# Patient Record
Sex: FEMALE | Race: WHITE | NOT HISPANIC OR LATINO | Employment: FULL TIME | URBAN - NONMETROPOLITAN AREA
[De-identification: names, ages, dates, MRNs, and addresses within clinical notes are randomized per-mention and may not be internally consistent; named-entity substitution may affect disease eponyms.]

---

## 2022-07-08 ENCOUNTER — LAB (OUTPATIENT)
Dept: LAB | Facility: HOSPITAL | Age: 57
End: 2022-07-08

## 2022-07-08 ENCOUNTER — OFFICE VISIT (OUTPATIENT)
Dept: FAMILY MEDICINE CLINIC | Facility: CLINIC | Age: 57
End: 2022-07-08

## 2022-07-08 VITALS
SYSTOLIC BLOOD PRESSURE: 122 MMHG | DIASTOLIC BLOOD PRESSURE: 78 MMHG | WEIGHT: 169.9 LBS | HEIGHT: 71 IN | HEART RATE: 68 BPM | OXYGEN SATURATION: 98 % | BODY MASS INDEX: 23.79 KG/M2 | RESPIRATION RATE: 18 BRPM

## 2022-07-08 DIAGNOSIS — Z12.4 ROUTINE PAPANICOLAOU SMEAR: ICD-10-CM

## 2022-07-08 DIAGNOSIS — R41.840 ATTENTION AND CONCENTRATION DEFICIT: ICD-10-CM

## 2022-07-08 DIAGNOSIS — Z76.89 ENCOUNTER TO ESTABLISH CARE: ICD-10-CM

## 2022-07-08 DIAGNOSIS — Z12.31 BREAST CANCER SCREENING BY MAMMOGRAM: ICD-10-CM

## 2022-07-08 DIAGNOSIS — R68.89 FORGETFULNESS: ICD-10-CM

## 2022-07-08 DIAGNOSIS — Z76.89 ENCOUNTER TO ESTABLISH CARE: Primary | ICD-10-CM

## 2022-07-08 DIAGNOSIS — L98.9 SKIN LESION OF FACE: ICD-10-CM

## 2022-07-08 LAB
ALBUMIN SERPL-MCNC: 4.8 G/DL (ref 3.5–5.2)
ALBUMIN/GLOB SERPL: 1.8 G/DL
ALP SERPL-CCNC: 102 U/L (ref 39–117)
ALT SERPL W P-5'-P-CCNC: 9 U/L (ref 1–33)
ANION GAP SERPL CALCULATED.3IONS-SCNC: 8.6 MMOL/L (ref 5–15)
AST SERPL-CCNC: 16 U/L (ref 1–32)
BILIRUB SERPL-MCNC: 0.7 MG/DL (ref 0–1.2)
BUN SERPL-MCNC: 13 MG/DL (ref 6–20)
BUN/CREAT SERPL: 16.9 (ref 7–25)
CALCIUM SPEC-SCNC: 9.1 MG/DL (ref 8.6–10.5)
CHLORIDE SERPL-SCNC: 101 MMOL/L (ref 98–107)
CHOLEST SERPL-MCNC: 204 MG/DL (ref 0–200)
CO2 SERPL-SCNC: 27.4 MMOL/L (ref 22–29)
CREAT SERPL-MCNC: 0.77 MG/DL (ref 0.57–1)
EGFRCR SERPLBLD CKD-EPI 2021: 91.2 ML/MIN/1.73
GLOBULIN UR ELPH-MCNC: 2.6 GM/DL
GLUCOSE SERPL-MCNC: 82 MG/DL (ref 65–99)
HBA1C MFR BLD: 4.9 % (ref 4.8–5.6)
HCV AB SER DONR QL: NORMAL
HDLC SERPL-MCNC: 54 MG/DL (ref 40–60)
LDLC SERPL CALC-MCNC: 138 MG/DL (ref 0–100)
LDLC/HDLC SERPL: 2.54 {RATIO}
POTASSIUM SERPL-SCNC: 4 MMOL/L (ref 3.5–5.2)
PROT SERPL-MCNC: 7.4 G/DL (ref 6–8.5)
SODIUM SERPL-SCNC: 137 MMOL/L (ref 136–145)
TRIGL SERPL-MCNC: 65 MG/DL (ref 0–150)
TSH SERPL DL<=0.05 MIU/L-ACNC: 1.54 UIU/ML (ref 0.27–4.2)
VLDLC SERPL-MCNC: 12 MG/DL (ref 5–40)

## 2022-07-08 PROCEDURE — 80061 LIPID PANEL: CPT

## 2022-07-08 PROCEDURE — 80050 GENERAL HEALTH PANEL: CPT

## 2022-07-08 PROCEDURE — 83036 HEMOGLOBIN GLYCOSYLATED A1C: CPT

## 2022-07-08 PROCEDURE — 86803 HEPATITIS C AB TEST: CPT

## 2022-07-08 PROCEDURE — 36415 COLL VENOUS BLD VENIPUNCTURE: CPT

## 2022-07-08 PROCEDURE — 99204 OFFICE O/P NEW MOD 45 MIN: CPT | Performed by: NURSE PRACTITIONER

## 2022-07-08 NOTE — PROGRESS NOTES
"Chief Complaint  Establish Care (Check up)    Subjective          Tracy Arthur presents to [facility] [place]  To establish care.  History of basal cell cancer, she has a place on the left side of her face that her previous dermatologist has been watching and she is needing a new referral to dermatologist since she has moved.  Has been having issues with memory and concentration and would like to be tested for attention deficit.           Abrasion  This is a chronic (skin lesion) problem. The current episode started more than 1 month ago. The problem occurs constantly. The problem has been waxing and waning. Pertinent negatives include no fatigue, headaches, rash, visual change or weakness. Nothing aggravates the symptoms. She has tried nothing for the symptoms. The treatment provided no relief.   Memory Loss  This is a chronic problem. The current episode started more than 1 year ago. The problem occurs intermittently. The problem has been waxing and waning. Pertinent negatives include no fatigue, headaches, rash, visual change or weakness. Exacerbated by: possible ADHD. She has tried nothing for the symptoms. The treatment provided no relief.          No outpatient medications prior to visit.     No facility-administered medications prior to visit.       Review of Systems   Constitutional: Negative for fatigue.   Skin: Negative for rash.   Neurological: Negative for weakness and headaches.         Objective   Vital Signs:   Visit Vitals  /78 (BP Location: Right arm, Patient Position: Sitting, Cuff Size: Large Adult)   Pulse 68   Resp 18   Ht 180.3 cm (71\")   Wt 77.1 kg (169 lb 14.4 oz)   LMP  (LMP Unknown)   SpO2 98%   BMI 23.70 kg/m²     Physical Exam  Vitals and nursing note reviewed.   Constitutional:       Appearance: She is well-developed.   HENT:      Head: Normocephalic and atraumatic.   Eyes:      General: Lids are normal.      Conjunctiva/sclera: " Conjunctivae normal.   Neck:      Thyroid: No thyroid mass or thyromegaly.      Trachea: Trachea normal. No tracheal tenderness.   Cardiovascular:      Rate and Rhythm: Normal rate.      Pulses: Normal pulses.      Heart sounds: Normal heart sounds.   Pulmonary:      Effort: Pulmonary effort is normal. No respiratory distress.      Breath sounds: Normal breath sounds. No wheezing.   Abdominal:      General: There is no distension.      Palpations: Abdomen is soft. There is no mass.   Musculoskeletal:         General: Normal range of motion.      Cervical back: Normal range of motion. No edema.   Lymphadenopathy:      Head:      Right side of head: No submental, submandibular or tonsillar adenopathy.      Left side of head: No submental, submandibular or tonsillar adenopathy.   Skin:     General: Skin is warm and dry.      Coloration: Skin is not pale.      Findings: No abrasion, erythema or lesion.   Neurological:      Mental Status: She is alert and oriented to person, place, and time.   Psychiatric:         Mood and Affect: Mood is not anxious. Affect is not inappropriate.         Speech: Speech normal.         Behavior: Behavior normal.         Thought Content: Thought content normal.         Judgment: Judgment normal. Judgment is not impulsive.        Result Review :                 Assessment and Plan    Diagnoses and all orders for this visit:    1. Encounter to establish care (Primary)  -     TSH; Future  -     Comprehensive Metabolic Panel; Future  -     Hemoglobin A1c; Future  -     CBC & Differential; Future  -     Lipid Panel; Future  -     Hepatitis C Antibody; Future    2. Forgetfulness  -     Ambulatory Referral to Behavioral Health    3. Attention and concentration deficit  -     Ambulatory Referral to Behavioral Health    4. Routine Papanicolaou smear  -     Ambulatory Referral to Obstetrics / Gynecology    5. Breast cancer screening by mammogram  -     Mammo screening digital tomosynthesis bilateral  w CAD; Future    6. Skin lesion of face  -     Ambulatory Referral to Dermatology      Complete ordered lab work   We will call with results    If you have any issues, please call the office    She tries to follow a plant based diet, having trouble find food options here, but tries to eat as healthy as possible         Follow Up   Return if symptoms worsen or fail to improve.  Patient was given instructions and counseling regarding her condition or for health maintenance advice. Please see specific information pulled into the AVS if appropriate.           This document has been electronically signed by DIEGO Urbano on July 8, 2022 16:17 CDT

## 2022-07-09 LAB
BASOPHILS # BLD AUTO: 0.05 10*3/MM3 (ref 0–0.2)
BASOPHILS NFR BLD AUTO: 1.1 % (ref 0–1.5)
DEPRECATED RDW RBC AUTO: 40 FL (ref 37–54)
EOSINOPHIL # BLD AUTO: 0.08 10*3/MM3 (ref 0–0.4)
EOSINOPHIL NFR BLD AUTO: 1.8 % (ref 0.3–6.2)
ERYTHROCYTE [DISTWIDTH] IN BLOOD BY AUTOMATED COUNT: 12.3 % (ref 12.3–15.4)
HCT VFR BLD AUTO: 48.4 % (ref 34–46.6)
HGB BLD-MCNC: 16.2 G/DL (ref 12–15.9)
IMM GRANULOCYTES # BLD AUTO: 0.01 10*3/MM3 (ref 0–0.05)
IMM GRANULOCYTES NFR BLD AUTO: 0.2 % (ref 0–0.5)
LYMPHOCYTES # BLD AUTO: 1.63 10*3/MM3 (ref 0.7–3.1)
LYMPHOCYTES NFR BLD AUTO: 35.8 % (ref 19.6–45.3)
MCH RBC QN AUTO: 29.9 PG (ref 26.6–33)
MCHC RBC AUTO-ENTMCNC: 33.5 G/DL (ref 31.5–35.7)
MCV RBC AUTO: 89.5 FL (ref 79–97)
MONOCYTES # BLD AUTO: 0.24 10*3/MM3 (ref 0.1–0.9)
MONOCYTES NFR BLD AUTO: 5.3 % (ref 5–12)
NEUTROPHILS NFR BLD AUTO: 2.54 10*3/MM3 (ref 1.7–7)
NEUTROPHILS NFR BLD AUTO: 55.8 % (ref 42.7–76)
NRBC BLD AUTO-RTO: 0 /100 WBC (ref 0–0.2)
PLATELET # BLD AUTO: 243 10*3/MM3 (ref 140–450)
PMV BLD AUTO: 11.8 FL (ref 6–12)
RBC # BLD AUTO: 5.41 10*6/MM3 (ref 3.77–5.28)
WBC NRBC COR # BLD: 4.55 10*3/MM3 (ref 3.4–10.8)

## 2022-07-19 DIAGNOSIS — R71.8 ELEVATED HEMATOCRIT: ICD-10-CM

## 2022-07-19 DIAGNOSIS — D58.2 ELEVATED HEMOGLOBIN: Primary | ICD-10-CM

## 2022-08-16 ENCOUNTER — TELEPHONE (OUTPATIENT)
Dept: FAMILY MEDICINE CLINIC | Facility: CLINIC | Age: 57
End: 2022-08-16

## 2022-08-16 NOTE — TELEPHONE ENCOUNTER
Patient called and said that she was referred to [facility]  They called her and they said that they don't see for the type of problems that she was being referred to them for. Patient's phone is cell is [phone] or home is [phone].

## 2022-09-02 DIAGNOSIS — R41.840 ATTENTION AND CONCENTRATION DEFICIT: Primary | ICD-10-CM

## 2022-09-30 ENCOUNTER — OFFICE VISIT (OUTPATIENT)
Dept: FAMILY MEDICINE CLINIC | Facility: CLINIC | Age: 57
End: 2022-09-30

## 2022-09-30 ENCOUNTER — LAB (OUTPATIENT)
Dept: LAB | Facility: HOSPITAL | Age: 57
End: 2022-09-30

## 2022-09-30 VITALS
DIASTOLIC BLOOD PRESSURE: 90 MMHG | RESPIRATION RATE: 24 BRPM | HEART RATE: 81 BPM | HEIGHT: 71 IN | SYSTOLIC BLOOD PRESSURE: 140 MMHG | BODY MASS INDEX: 25.19 KG/M2 | WEIGHT: 179.9 LBS | OXYGEN SATURATION: 99 %

## 2022-09-30 DIAGNOSIS — H53.9 VISION CHANGES: ICD-10-CM

## 2022-09-30 DIAGNOSIS — R68.89 FORGETFULNESS: ICD-10-CM

## 2022-09-30 DIAGNOSIS — D58.2 ELEVATED HEMOGLOBIN: ICD-10-CM

## 2022-09-30 DIAGNOSIS — R29.818 NEUROLOGICAL ABNORMALITY: Primary | ICD-10-CM

## 2022-09-30 DIAGNOSIS — R71.8 ELEVATED HEMATOCRIT: ICD-10-CM

## 2022-09-30 LAB
ALBUMIN SERPL-MCNC: 4.3 G/DL (ref 3.5–5.2)
ALBUMIN/GLOB SERPL: 1.6 G/DL
ALP SERPL-CCNC: 91 U/L (ref 39–117)
ALT SERPL W P-5'-P-CCNC: 14 U/L (ref 1–33)
ANION GAP SERPL CALCULATED.3IONS-SCNC: 8.5 MMOL/L (ref 5–15)
AST SERPL-CCNC: 15 U/L (ref 1–32)
BASOPHILS # BLD AUTO: 0.05 10*3/MM3 (ref 0–0.2)
BASOPHILS NFR BLD AUTO: 1.3 % (ref 0–1.5)
BILIRUB SERPL-MCNC: 0.5 MG/DL (ref 0–1.2)
BUN SERPL-MCNC: 8 MG/DL (ref 6–20)
BUN/CREAT SERPL: 9.3 (ref 7–25)
CALCIUM SPEC-SCNC: 9.6 MG/DL (ref 8.6–10.5)
CHLORIDE SERPL-SCNC: 103 MMOL/L (ref 98–107)
CO2 SERPL-SCNC: 28.5 MMOL/L (ref 22–29)
CREAT SERPL-MCNC: 0.86 MG/DL (ref 0.57–1)
DEPRECATED RDW RBC AUTO: 40.4 FL (ref 37–54)
EGFRCR SERPLBLD CKD-EPI 2021: 79.9 ML/MIN/1.73
EOSINOPHIL # BLD AUTO: 0.08 10*3/MM3 (ref 0–0.4)
EOSINOPHIL NFR BLD AUTO: 2.1 % (ref 0.3–6.2)
ERYTHROCYTE [DISTWIDTH] IN BLOOD BY AUTOMATED COUNT: 12.7 % (ref 12.3–15.4)
GLOBULIN UR ELPH-MCNC: 2.7 GM/DL
GLUCOSE SERPL-MCNC: 96 MG/DL (ref 65–99)
HCT VFR BLD AUTO: 45.2 % (ref 34–46.6)
HGB BLD-MCNC: 15.7 G/DL (ref 12–15.9)
IMM GRANULOCYTES # BLD AUTO: 0.01 10*3/MM3 (ref 0–0.05)
IMM GRANULOCYTES NFR BLD AUTO: 0.3 % (ref 0–0.5)
LYMPHOCYTES # BLD AUTO: 1.56 10*3/MM3 (ref 0.7–3.1)
LYMPHOCYTES NFR BLD AUTO: 40.6 % (ref 19.6–45.3)
MCH RBC QN AUTO: 30.3 PG (ref 26.6–33)
MCHC RBC AUTO-ENTMCNC: 34.7 G/DL (ref 31.5–35.7)
MCV RBC AUTO: 87.3 FL (ref 79–97)
MONOCYTES # BLD AUTO: 0.22 10*3/MM3 (ref 0.1–0.9)
MONOCYTES NFR BLD AUTO: 5.7 % (ref 5–12)
NEUTROPHILS NFR BLD AUTO: 1.92 10*3/MM3 (ref 1.7–7)
NEUTROPHILS NFR BLD AUTO: 50 % (ref 42.7–76)
NRBC BLD AUTO-RTO: 0 /100 WBC (ref 0–0.2)
PLATELET # BLD AUTO: 204 10*3/MM3 (ref 140–450)
PMV BLD AUTO: 11.5 FL (ref 6–12)
POTASSIUM SERPL-SCNC: 4.5 MMOL/L (ref 3.5–5.2)
PROT SERPL-MCNC: 7 G/DL (ref 6–8.5)
RBC # BLD AUTO: 5.18 10*6/MM3 (ref 3.77–5.28)
SODIUM SERPL-SCNC: 140 MMOL/L (ref 136–145)
WBC NRBC COR # BLD: 3.84 10*3/MM3 (ref 3.4–10.8)

## 2022-09-30 PROCEDURE — 80053 COMPREHEN METABOLIC PANEL: CPT

## 2022-09-30 PROCEDURE — 85025 COMPLETE CBC W/AUTO DIFF WBC: CPT

## 2022-09-30 PROCEDURE — 36415 COLL VENOUS BLD VENIPUNCTURE: CPT

## 2022-09-30 PROCEDURE — 99214 OFFICE O/P EST MOD 30 MIN: CPT | Performed by: NURSE PRACTITIONER

## 2022-09-30 NOTE — PROGRESS NOTES
Chief Complaint  cognitive issues    Subjective          Rogelio Arthur presents to [facility] [place] with concerns of memory issues. She is afraid she is going to lose her job because of this. She can't remember her bosses name at the job she has worked at for 6 months, she cant recognize peoples faces she know she knows. She can't remember procedures she has done for a long time. People ask her if she's okay a lot and she doesn't know why. She has to have people repeat what they are saying frequently. She forgets where she is at times but remembers pretty quick. She misinterprets things people tell her. Her vision flips at time. Has had a headache for 3 weeks. She has recently ordered a radon and mold test for her house to rule this out as a reason. She has a history of 2 concussions, severe ear infection as a kid.   Neurologic Problem  The patient's primary symptoms include an altered mental status, a loss of balance and memory loss. The patient's pertinent negatives include no visual change or weakness. This is a chronic problem. The current episode started more than 1 month ago. The neurological problem developed gradually. The problem has been rapidly worsening since onset. There was no focality noted. Associated symptoms include confusion, dizziness, headaches, light-headedness and vertigo. Pertinent negatives include no fatigue. Past treatments include aspirin. The treatment provided no relief.   Abrasion  This is a chronic (skin lesion) problem. The current episode started more than 1 month ago. The problem occurs constantly. The problem has been waxing and waning. Associated symptoms include headaches and vertigo. Pertinent negatives include no fatigue, rash, visual change or weakness. Nothing aggravates the symptoms. She has tried nothing for the symptoms. The treatment provided no relief.   Memory Loss  This is a chronic problem. The current  "episode started more than 1 year ago. The problem occurs intermittently. The problem has been gradually worsening. Associated symptoms include headaches and vertigo. Pertinent negatives include no fatigue, rash, visual change or weakness. She has tried rest and relaxation for the symptoms. The treatment provided no relief.     No outpatient medications prior to visit.     No facility-administered medications prior to visit.       Review of Systems   Constitutional: Negative for fatigue.   Skin: Negative for rash.   Neurological: Positive for dizziness, vertigo, light-headedness, headaches and loss of balance. Negative for weakness.   Psychiatric/Behavioral: Positive for confusion and memory loss.         Objective   Vital Signs:   Visit Vitals  /90 (BP Location: Left arm, Patient Position: Sitting, Cuff Size: Adult)   Pulse 81   Resp 24   Ht 180.3 cm (71\")   Wt 81.6 kg (179 lb 14.4 oz)   SpO2 99%   BMI 25.09 kg/m²     Physical Exam  Vitals and nursing note reviewed.   Constitutional:       Appearance: She is well-developed.   HENT:      Head: Normocephalic and atraumatic.   Eyes:      General: Lids are normal.      Conjunctiva/sclera: Conjunctivae normal.   Neck:      Thyroid: No thyroid mass or thyromegaly.      Trachea: Trachea normal. No tracheal tenderness.   Cardiovascular:      Rate and Rhythm: Normal rate.      Pulses: Normal pulses.      Heart sounds: Normal heart sounds.   Pulmonary:      Effort: Pulmonary effort is normal. No respiratory distress.      Breath sounds: Normal breath sounds. No wheezing.   Abdominal:      General: There is no distension.      Palpations: Abdomen is soft. There is no mass.   Musculoskeletal:         General: Normal range of motion.      Cervical back: Normal range of motion. No edema.   Lymphadenopathy:      Head:      Right side of head: No submental, submandibular or tonsillar adenopathy.      Left side of head: No submental, submandibular or tonsillar adenopathy. "   Skin:     General: Skin is warm and dry.      Coloration: Skin is not pale.      Findings: No abrasion, erythema or lesion.   Neurological:      General: No focal deficit present.      Mental Status: She is alert and oriented to person, place, and time.      GCS: GCS eye subscore is 4. GCS verbal subscore is 5. GCS motor subscore is 6.      Cranial Nerves: Cranial nerves are intact. No cranial nerve deficit.      Sensory: No sensory deficit.      Motor: Motor function is intact.      Coordination: Coordination is intact.      Gait: Gait is intact.   Psychiatric:         Mood and Affect: Mood is anxious. Affect is not inappropriate.         Speech: Speech normal.         Behavior: Behavior normal.         Thought Content: Thought content normal.         Judgment: Judgment normal. Judgment is not impulsive.        Result Review :     Common labs    Common Labs 7/8/22 7/8/22 7/8/22 7/8/22    1135 1135 1135 1135   Glucose 82      BUN 13      Creatinine 0.77      Sodium 137      Potassium 4.0      Chloride 101      Calcium 9.1      Albumin 4.80      Total Bilirubin 0.7      Alkaline Phosphatase 102      AST (SGOT) 16      ALT (SGPT) 9      WBC    4.55   Hemoglobin    16.2 (A)   Hematocrit    48.4 (A)   Platelets    243   Total Cholesterol  204 (A)     Triglycerides  65     HDL Cholesterol  54     LDL Cholesterol   138 (A)     Hemoglobin A1C   4.90    (A) Abnormal value                      Assessment and Plan {CC Problem List  Visit Diagnosis  ROS  Review (Popup)  [facility] Maintenance  Quality  BestPractice  Medications  SmartSets  SnapShot Encounters  Media :23}   Diagnoses and all orders for this visit:    1. Neurological abnormality (Primary)  -     Ambulatory Referral to Neurology  -     Cancel: CT Head Without Contrast; Future  -     CT Head Without Contrast; Future    2. Vision changes  -     Ambulatory Referral to Neurology  -     Cancel: CT Head Without Contrast; Future  -     CT Head Without  Contrast; Future    3. Forgetfulness  -     Ambulatory Referral to Neurology  -     Cancel: CT Head Without Contrast; Future  -     CT Head Without Contrast; Future    4. Elevated hemoglobin (HCC)  -     Comprehensive Metabolic Panel; Future  -     CBC & Differential; Future    5. Elevated hematocrit  -     Comprehensive Metabolic Panel; Future  -     CBC & Differential; Future      Referral placed to neurology  And will call for appointment    CT ordered  They will call to schedule    Previous abnormal lab work  Complete ordered lab work today  We will call with results      Follow Up   Return if symptoms worsen or fail to improve, for Recheck.  Patient was given instructions and counseling regarding her condition or for health maintenance advice. Please see specific information pulled into the AVS if appropriate.           This document has been electronically signed by DIEGO Urbano on September 30, 2022 16:31 CDT  Answers for HPI/ROS submitted by the patient on 9/29/2022  What is the primary reason for your visit?: Neurological Problem

## 2022-10-03 ENCOUNTER — TELEPHONE (OUTPATIENT)
Dept: FAMILY MEDICINE CLINIC | Facility: CLINIC | Age: 57
End: 2022-10-03

## 2022-10-04 ENCOUNTER — TELEPHONE (OUTPATIENT)
Dept: FAMILY MEDICINE CLINIC | Facility: CLINIC | Age: 57
End: 2022-10-04

## 2022-10-04 ENCOUNTER — OFFICE VISIT (OUTPATIENT)
Dept: OBSTETRICS AND GYNECOLOGY | Facility: CLINIC | Age: 57
End: 2022-10-04

## 2022-10-04 VITALS
WEIGHT: 170 LBS | HEIGHT: 71 IN | DIASTOLIC BLOOD PRESSURE: 86 MMHG | BODY MASS INDEX: 23.8 KG/M2 | SYSTOLIC BLOOD PRESSURE: 138 MMHG

## 2022-10-04 DIAGNOSIS — Z01.419 WOMEN'S ANNUAL ROUTINE GYNECOLOGICAL EXAMINATION: Primary | ICD-10-CM

## 2022-10-04 PROCEDURE — 99396 PREV VISIT EST AGE 40-64: CPT | Performed by: NURSE PRACTITIONER

## 2022-10-04 NOTE — PROGRESS NOTES
"Subjective   Rogelio Fara Arthur is a 57 y.o. Annual gynecological exam, concerns     History of Present Illness  Mammo: \"5 years ago?\"  Pap: \"3-4 years ago\"  LMP: 09/04/2022  BC: Mirena    Pt presents for annual gynecological exam with complaints of brain fog.  She is curious if that has something to do with her hormones.  Also, she reports having Mirena for past 7 years and curious when it needs to be removed.      Gynecologic Exam  The patient's pertinent negatives include no genital itching, genital lesions, genital odor, genital rash, pelvic pain, vaginal bleeding or vaginal discharge. The patient is experiencing no pain. Pertinent negatives include no abdominal pain, chills, constipation, diarrhea, dysuria, fever, flank pain, frequency, headaches, hematuria, rash or urgency. Her past medical history is significant for endometriosis and menorrhagia.       The following portions of the patient's history were reviewed and updated as appropriate: allergies, current medications, past family history, past medical history, past social history, past surgical history and problem list.    Review of Systems   Constitutional: Negative for chills, diaphoresis, fatigue, fever and unexpected weight change.   Respiratory: Negative for apnea, chest tightness and shortness of breath.    Cardiovascular: Negative for chest pain and palpitations.   Gastrointestinal: Negative for abdominal distention, abdominal pain, constipation and diarrhea.   Genitourinary: Positive for menorrhagia. Negative for decreased urine volume, difficulty urinating, dysuria, enuresis, flank pain, frequency, genital sores, hematuria, pelvic pain, urgency, vaginal bleeding, vaginal discharge and vaginal pain.   Skin: Negative for rash.   Neurological: Negative for headaches.   Psychiatric/Behavioral: Negative for sleep disturbance and suicidal ideas.         Objective   Physical Exam  Vitals and nursing note reviewed. Exam conducted with a chaperone " present.   Constitutional:       General: She is awake. She is not in acute distress.     Appearance: Normal appearance. She is well-developed and well-groomed. She is not ill-appearing, toxic-appearing or diaphoretic.   Neck:      Thyroid: No thyroid mass, thyromegaly or thyroid tenderness.   Cardiovascular:      Rate and Rhythm: Normal rate and regular rhythm.      Heart sounds: Normal heart sounds.   Pulmonary:      Effort: Pulmonary effort is normal.      Breath sounds: Normal breath sounds.   Chest:   Breasts:      Sergey Score is 5. Breasts are symmetrical.      Right: Normal. No swelling, bleeding, inverted nipple, mass, nipple discharge, skin change, tenderness, axillary adenopathy or supraclavicular adenopathy.      Left: Normal. No swelling, bleeding, inverted nipple, mass, nipple discharge, skin change, tenderness, axillary adenopathy or supraclavicular adenopathy.       Abdominal:      General: Bowel sounds are normal. There is no distension.      Palpations: Abdomen is soft.      Tenderness: There is no abdominal tenderness.   Genitourinary:     General: Normal vulva.      Exam position: Lithotomy position.      Sergey stage (genital): 5.      Labia:         Right: No rash, tenderness, lesion or injury.         Left: No rash, tenderness, lesion or injury.       Urethra: No prolapse, urethral pain, urethral swelling or urethral lesion.      Vagina: Normal.      Cervix: Normal.      Uterus: Normal.       Adnexa:         Right: No mass, tenderness or fullness.          Left: No mass, tenderness or fullness.        Comments: IUD strings visualized, pap smear obtained   Lymphadenopathy:      Upper Body:      Right upper body: No supraclavicular, axillary or pectoral adenopathy.      Left upper body: No supraclavicular, axillary or pectoral adenopathy.      Lower Body: No right inguinal adenopathy. No left inguinal adenopathy.   Skin:     General: Skin is warm and dry.   Neurological:      Mental Status: She  is alert and oriented to person, place, and time.      Gait: Gait is intact.   Psychiatric:         Attention and Perception: Attention and perception normal.         Mood and Affect: Mood and affect normal.         Speech: Speech normal.         Behavior: Behavior normal. Behavior is cooperative.           Assessment & Plan   Diagnoses and all orders for this visit:    1. Women's annual routine gynecological examination (Primary)  -     LIQUID-BASED PAP SMEAR, [facility] ([facility])        Brain fog is common perimenopausal symptom.  Discussed Mirena approved for 8 year use, so we can remove it today or wait till 2023 when it will .  She desires to wait till 8 year expiration date.  Patient educated and encouraged to do monthly self breast exam. Mammogram today.  If pap smear is normal patient will receive a letter in the mail in about two weeks.  If pap smear is abnormal we will call patient and follow up with plan.    RTC in 2023 for Mirena removal, re-check FSH/LH 2023.

## 2022-10-05 ENCOUNTER — APPOINTMENT (OUTPATIENT)
Dept: CT IMAGING | Facility: HOSPITAL | Age: 57
End: 2022-10-05

## 2022-10-05 LAB — REF LAB TEST METHOD: NORMAL

## 2022-10-12 ENCOUNTER — TELEPHONE (OUTPATIENT)
Dept: OBSTETRICS AND GYNECOLOGY | Facility: CLINIC | Age: 57
End: 2022-10-12

## 2022-10-12 NOTE — TELEPHONE ENCOUNTER
I CALLED THE PATIENT TO SCHEDULE HER FOR A FOLLOW UP MAMMOGRAM AND AN ULTRASOUND. I DIDN'T GET AN ANSWER SO I LEFT HER A MESSAGE TO CALL ME.

## 2022-10-14 ENCOUNTER — TELEPHONE (OUTPATIENT)
Dept: FAMILY MEDICINE CLINIC | Facility: CLINIC | Age: 57
End: 2022-10-14

## 2022-10-17 ENCOUNTER — HOSPITAL ENCOUNTER (OUTPATIENT)
Dept: MRI IMAGING | Facility: HOSPITAL | Age: 57
Discharge: HOME | End: 2022-10-17
Admitting: PSYCHIATRY & NEUROLOGY

## 2022-10-17 ENCOUNTER — TELEPHONE (OUTPATIENT)
Dept: FAMILY MEDICINE CLINIC | Facility: CLINIC | Age: 57
End: 2022-10-17

## 2022-10-17 DIAGNOSIS — R41.3 AMNESIA: ICD-10-CM

## 2022-10-17 PROCEDURE — 70551 MRI BRAIN STEM W/O DYE: CPT

## 2022-11-18 ENCOUNTER — TELEPHONE (OUTPATIENT)
Dept: FAMILY MEDICINE CLINIC | Facility: CLINIC | Age: 57
End: 2022-11-18

## 2022-11-18 NOTE — TELEPHONE ENCOUNTER
Patient called wanting to talk to someone about the testing Ilda had orders. Patient said she was positive for mold and her  has found mold all over their house in the walls. Please call [phone]

## 2022-11-29 DIAGNOSIS — Z77.120 EXPOSURE TO MOLD: Primary | ICD-10-CM

## 2022-12-27 ENCOUNTER — OFFICE VISIT (OUTPATIENT)
Dept: BEHAVIORAL HEALTH | Facility: CLINIC | Age: 57
End: 2022-12-27
Payer: COMMERCIAL

## 2022-12-27 DIAGNOSIS — F90.2 ATTENTION DEFICIT HYPERACTIVITY DISORDER, COMBINED TYPE: Primary | ICD-10-CM

## 2022-12-27 PROCEDURE — 90791 PSYCH DIAGNOSTIC EVALUATION: CPT | Performed by: PSYCHOLOGIST

## 2023-01-24 ENCOUNTER — PROCEDURE VISIT (OUTPATIENT)
Dept: OBSTETRICS AND GYNECOLOGY | Facility: CLINIC | Age: 58
End: 2023-01-24
Payer: COMMERCIAL

## 2023-01-24 ENCOUNTER — OFFICE VISIT (OUTPATIENT)
Dept: BEHAVIORAL HEALTH | Facility: CLINIC | Age: 58
End: 2023-01-24
Payer: COMMERCIAL

## 2023-01-24 VITALS
WEIGHT: 167 LBS | SYSTOLIC BLOOD PRESSURE: 120 MMHG | BODY MASS INDEX: 23.38 KG/M2 | HEIGHT: 71 IN | DIASTOLIC BLOOD PRESSURE: 80 MMHG

## 2023-01-24 DIAGNOSIS — F90.2 ATTENTION DEFICIT HYPERACTIVITY DISORDER, COMBINED TYPE: ICD-10-CM

## 2023-01-24 DIAGNOSIS — N95.1 PERIMENOPAUSAL SYMPTOMS: ICD-10-CM

## 2023-01-24 DIAGNOSIS — Z30.433 ENCOUNTER FOR IUD REMOVAL AND REINSERTION: Primary | ICD-10-CM

## 2023-01-24 DIAGNOSIS — F41.1 GENERALIZED ANXIETY DISORDER: ICD-10-CM

## 2023-01-24 PROCEDURE — 58300 INSERT INTRAUTERINE DEVICE: CPT | Performed by: NURSE PRACTITIONER

## 2023-01-24 PROCEDURE — 58301 REMOVE INTRAUTERINE DEVICE: CPT | Performed by: NURSE PRACTITIONER

## 2023-01-24 PROCEDURE — 96130 PSYCL TST EVAL PHYS/QHP 1ST: CPT | Performed by: PSYCHOLOGIST

## 2023-01-24 NOTE — PROGRESS NOTES
IUD Removal and Immediate Reinsertion    No LMP recorded. Patient has had an implant.    Date of procedure:  1/24/2023    Risks and benefits discussed? yes  All questions answered? yes  Consents given by the patient  Written consent obtained? yes  Reason for removal: Device expiration.  Pt desires device to be placed again due to menorrhagia history.  Called insurance to confirm coverage.  Insurance reports 100% coverage.  Reference #: [id number]                   called on 01/24/2023 @ 11:35 am.          Procedure documentation:    A speculum was placed in order to view the cervix.  Cervical dilation did not need to be performed in order to access the string.  The IUD string was easily seen.  The string was grasped and the IUD was removed without difficulty.  The IUD did not appear to be adherent to the uterine cavity. It was removed intact.    The cervix was cleansed with an antiseptic solution.  The anterior lip of the cervix was grasped with a tenaculum and the uterine cavity was gently sounded.  There was mild difficulty passing the sound through the cervix.  Cervical dilation did not need to be performed prior to pacing the IUD.  The uterus was midpositioned and sounded to 7.5 cms.  The Mirena was then prepared per the manufacturers instructions.    The Mirena was advanced to a point 2 cms from the fundus and then the arms were released from the sheath.  The device was advanced to the fundus and the device was released fully from the sheath. The string was cut 2.5 cms in length.  Bleeding from the cervix was scant.    She tolerated the procedure without any difficulty.     Mirena [id number]    Post procedure instructions: Call if any fever or excessive bleeding or pain.    Follow up needed: As needed or for annual exam.    This note was electronically signed.    DIEGO Layton  January 24, 2023

## 2023-01-30 NOTE — PROGRESS NOTES
"2/16/2023    Rogelio Atrhur, a 57 y.o. female, was seen today for initial appointment lasting 45 minutes.  10-10:45am CST  Patient is referred by Ilda Le APRN ADHD and ASD.    She is 5'10\" and weighs 168 pounds    SUBJECTIVE:  He is experiencing: inattention, hyperactivity, academic underachievement, restlessness, fidgety, impulsivity, talkativeness, racing thoughts, mood swings, easily distracted, poor organizational skills, interrupts others, quick temper, irritability, forgetfulness, and low tolerance to stress.    She denied a history of emotional trauma.     FAMILY HISTORY:  ADHD- none  MDD- mother, sister x 2, brother   Anxiety- unknown  Alcohol- brother, maternal grandfather, paternal grandfather, paternal grandmother  Drug-brother    The patient met all developmental milestones on time.    Her parents  in 1961.  The relationship produced 4 children (''63 daughter, '64 daughter, '67 daughter, '68 son, and '73 daughter).  They  in 1977.  Her father was an . Her mother worked retail.      Her father remarried in 1977.  They had no children.    Her mother never remarried.      She  in 2001.  They had no children.  They  in 2012.    She has a fiance (2016 to present).  He is retired [employer].     She dropped out of the 12th grade at [employer] in [place], [place].  She obtained a GED in 2021.    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     MENTAL STATUS:  The patient was appropriately dressed and groomed.  The patient’s speech was WNL (rate, volume, articulation, coherence, etc.).  The patient was oriented to time, place, and person.  The patient’s memory (remote and recent) was intact.  The patient’s attention and concentration were WNL.  The patient’s mood and affect were congruent.    The patient’s thought content did not appear to possess delusions or hallucinations. These results do not appear to be " significantly influenced by the effects of visual, auditory, or motor deficits, environmental/economic or cultural differences.  The patient denied SI/HI.        strengths: the patient is polite and courteous  weakness: the patient is unable to manage symptoms   short term goal: the patient will reduce symptoms from 11 x day to 1 x week  long term goal: the patient will eliminate the above-mentioned symptoms    DIAGNOSIS:    ICD-10-CM ICD-9-CM   1. Attention deficit hyperactivity disorder, combined type  F90.2 314.01 (Rule out)       ASSESSMENT PLAN:  She will complete the psychological assessment.  Copied text within this note has been reviewed and is accurate as of 02/16/23              This document has been electronically signed by Yoshi Curry, PhD on February 16, 2023 09:59 CST

## 2023-02-27 NOTE — PROGRESS NOTES
[facility]  [address] [place] [id number]  PHONE : [phone]  FAX: [phone]      DATE:  01/24/2023    PATIENT:   Rogelio Arthur 1965                                 MEDICAL RECORD #:  [id number]  Chronological age: 57 y.o.   Date of Psychological Assessment:   Examiner: Yoshi Curry, PhD   Licensed Psychologist      Tests Administered:  Bhakti Brief Intelligence Test- Second Edition (KBIT-2)  Wide Range Achievement Test- Fifth Edition (WRAT-5)  Brown ADD Scales (Brown ADD)  Galvan Depression Inventory (BDI-2)  Galvan Anxiety inventory (DANYELL)  Morteza’s Incomplete Sentence Blank- Adult (RISB-A)  Clinical Interview and Review of Records    Identification and Referral Information:   Ms. Arthur was referred by DIEGO Urbano ADHD and ASD.     Presenting Problem and Background Information:  Ms. Arthur is experiencing: inattention, hyperactivity, academic underachievement, restlessness, fidgety, impulsivity, talkativeness, racing thoughts, mood swings, easily distracted, poor organizational skills, interrupts others, quick temper, irritability, forgetfulness, and low tolerance to stress.     She denied a history of emotional trauma.      Behavioral Observations:  Rogelio was alert and oriented to time, place, and person. Her thought content did not appear to possess delusions or hallucinations. These results do not appear to be significantly influenced by the effects of visual, auditory, or motor deficits, environmental/economic or cultural differences. The following results are thought to be valid.      Test Results:  The interpretive information in this report should be viewed as only one source of hypotheses and no decision should be based solely on this information. This data should be integrated with all other sources of information in reaching professional decisions about the individual. This report is confidential and intended  for use by qualified professionals only.    KBIT-2  The KBIT-2 is a brief, individually administered measure of verbal and nonverbal intelligence for children, adolescents, and adults, spanning the ages from 4 to 90 years.    Ms. Arthur obtained an IQ Composite Standard Score of 101 which yielded a Percentile of 53 and places her in the Average range of intellectual functioning. A Percentile of 53 means that she scored as well as or better than 53 out of 100 peers in the sample population. At a 90% Confidence Interval her true IQ Score falls between 96 and 106.  Individuals with similar scores learn material at a similar rate compared to same age peers and require a similar degree of examples, repetition and guided practice as same-aged peers.    The 15 point difference between the Verbal Standard Score (94, Average, 34%ile 18.6 years age equivalent) and the Nonverbal Standard Score (109, Average, 73%ile, 18.6 years age equivalent) was not significant and suggests that her verbal reasoning abilities are equally developed compared to her nonverbal reasoning abilities.    WRAT-5   The WRAT-5 is a screening measure of academic achievement. Ms. Arthur dropped out of the 12th grade at [employer] in [place], [place].  She obtained a GED in 2021. 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            The results of the WRAT-5 indicate she is performing at a Grade Score of >12.9 standard score of 109 and a percentile rank of 73. On the Spelling Subtest, the examinee obtained a Standard Score of 114 (83%ile) and a Grade Score of >12.9. On the Math Computation Subtest, the examinee obtained a Standard Score of 107 (68%ile) and a Grade Score of >12.9. On the Sentence Comprehension, the examinee obtained a Standard Score of 110 (75%ile) and a Grade Score of >12.9. On the Reading Composite the examinee obtained a Standard Score of 111 (77%ile).        The scores on  the WRAT-5 are commensurate with her cognitive ability.     Brown ADD Scales  The Brown ADD Scales help to assess a wide range of symptoms of executive function impairments associated with ADHD/ADD.  The Brown ADD Scales include 40 items that assess five clusters of ADHD-related executive function impairments.      Ms. Arthur, and Ye Chaves completed the rating scales.  T-Scores 60 and above are considered clinically significant.  She obtained the following T-scores:      Activation Attention Effort Affect Memory Total Score   Self 56 91 53 52 96 73   Mr. Chaves 53 69 50 50 65 59     The results of the Brown ADD Scales indicate symptoms of ADHD.  However, no data exists to establish the onset of symptoms.  A provisional diagnosis is warranted.     BDI-2  The BDI-2 is a 21 item, self-report instrument for measuring the severity of depression in adults and adolescents aged 13 years or older. The BDI-2 was developed for the assessment of symptoms corresponding to criteria for diagnosing depressive disorders listed in the American Psychiatric Association's Diagnostic and Statistical Manual of Mental Disorders (DSM-IV-TR). Scores above 28 are considered “severe”, 20-28 are “moderate”, 14-19 are “mild”, and 0-13 are “minimal”.        Ms. Arthur obtained a score of 10 on the BDI-2. This score falls in the “minimal” range of depressive symptoms.  She is not endorsing clinically significant symptoms of depression.    DANYELL  The Galvan Anxiety Inventory (DANYELL) is a widely used 21-item self-report inventory used to assess anxiety levels in adults and adolescents. It has been used in multiple studies, including in treatment-outcome studies for individuals who have experienced traumas. The age range for the measure is from 17 to 80 years.        The DANYELL discriminates between anxious and non-anxious groups.  The inventory contains 21 items rated from 0 to 3 by the taker, with a total possible score of 63 points. The items  "are experiences related to anxiety such as \"Fear of worst happening\" or \"Heart pounding/racing\".  Total scores 0-7 = minimal, 8-15 = mild, 16-25 = moderate, 26+ = severe.  Scores of 26 and above indicate statistically significant levels of anxiety.    Ms. Arthur obtained a score of 13 on the DANYELL.  This score falls in the “mild” level of anxiety symptoms. She is endorsing clinically significant symptoms of anxiety.     RISB-A  Morteza’s Incomplete Sentence Blank- Adult is a 40 item fill-in-the blank project test designed to gather psychological data in the assessment process.  The results of the RISB-A indicate conflict with others, passivity, poor social skills, low tolerance to stress, and pessimism.      Diagnosis  Problems Addressed this Visit    None  Visit Diagnoses     Attention deficit hyperactivity disorder, combined type        Generalized anxiety disorder          Diagnoses       Codes Comments    Attention deficit hyperactivity disorder, combined type     ICD-10-CM: F90.2  ICD-9-CM: 314.01 Provisional     Generalized anxiety disorder     ICD-10-CM: F41.1  ICD-9-CM: 300.02 Mild         Summary:   Ms. Arthur was referred by DIEGO Urbano ADHD and ASD.    The results of the KBIT-2 indicate she is performing in the Average range of cognitive ability (101 IQ Composite).The results of the WRAT-5 indicate the following grade scores: >12.9 in Word Reading, >12.9 in Spelling, >12.9 in Math Computation, and >12.9 in Sentence Comprehension.  The results of the Brown ADD Scales indicate symptoms of ADHD.  However, no data exists to establish the onset of symptoms.  A provisional diagnosis is warranted. The results of the BDI-2 do not indicate depression. The results of the DANYELL indicate anxiety. The results of the RISB-A indicate conflict with others, passivity, poor social skills, low tolerance to stress, and pessimism    Recommendations:  It is the recommendation of the undersigned that Rogelio Arthur " receive:   1. ongoing counseling as necessary to address ADHD and anxiety  2. psychiatric treatment as needed  3. educational and vocational assistance as necessary     I spent 60 minutes in direct face to face contact with patient.  Greater than 50% of this time was spent counseling patient and discussing plan of care.  Copied text within this note has been reviewed and is accurate as of 02/27/23              This document has been electronically signed by Yoshi Curry, PhD on February 27, 2023 08:32 CST        Yoshi Curry, PhD   Licensed Psychologist